# Patient Record
(demographics unavailable — no encounter records)

---

## 2025-02-26 NOTE — HISTORY OF PRESENT ILLNESS
[de-identified] : 86 year old female presents for Right ear issue States pulsing in Rt ear for several years Intermittent No pain, no tinnitus, no drainage Rt Myr and tube 2023 better w tube until 1 mo ago Hearing stable

## 2025-02-26 NOTE — DATA REVIEWED
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 Type A tymps, AU. Testing via inserts: AD- WNL w/ mild SNHL at 3, 6 & 8kHz. AS- WNL up to 1kHz, sloping to a mild to moderately-severe SNHL. Recs: 1) F/u w/ MD 2) Hearing aid for the left ear pending med clearance 3) Annual

## 2025-02-26 NOTE — PROCEDURE
[Cerumen Impaction] : Cerumen Impaction [FreeTextEntry6] : Indication:  ear plugging  and discomfort Large amount cerumen cleared right ear instrumentation with curettes, forceps and suction. Ear canals and tympanic membranes are unremarkable. nonfunctioning vent tube removed tm intact

## 2025-05-13 NOTE — DISCUSSION/SUMMARY
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

## 2025-05-13 NOTE — HISTORY OF PRESENT ILLNESS
[TextBox_4] : 86 year old postmenopausal female who presents today for a routine GYN exam. The patient has no current GYN complaints and denies any postmenopausal bleeding. No changes to bladder or bowel pattern. The patient denies abdominal pain or bloating.

## 2025-05-13 NOTE — PLAN
[FreeTextEntry1] : Replens vaginal moisturizer discussed Patient to follow up in 1 year for annual GYN exam Mammogram: now rx given  Colonoscopy due: 1/2026 dr. webster.  Bone density due:  5/2026 Pap ordered Hemoccult ordered  All questions answered, patient agreeable with plan.